# Patient Record
Sex: MALE | Race: WHITE | NOT HISPANIC OR LATINO
[De-identification: names, ages, dates, MRNs, and addresses within clinical notes are randomized per-mention and may not be internally consistent; named-entity substitution may affect disease eponyms.]

---

## 2017-12-27 PROBLEM — Z00.00 ENCOUNTER FOR PREVENTIVE HEALTH EXAMINATION: Status: ACTIVE | Noted: 2017-12-27

## 2018-01-26 ENCOUNTER — APPOINTMENT (OUTPATIENT)
Dept: NEUROLOGY | Facility: CLINIC | Age: 58
End: 2018-01-26
Payer: COMMERCIAL

## 2018-01-26 VITALS
WEIGHT: 199 LBS | HEART RATE: 65 BPM | BODY MASS INDEX: 29.47 KG/M2 | HEIGHT: 69 IN | DIASTOLIC BLOOD PRESSURE: 77 MMHG | SYSTOLIC BLOOD PRESSURE: 130 MMHG

## 2018-01-26 DIAGNOSIS — Z87.891 PERSONAL HISTORY OF NICOTINE DEPENDENCE: ICD-10-CM

## 2018-01-26 DIAGNOSIS — Z86.79 PERSONAL HISTORY OF OTHER DISEASES OF THE CIRCULATORY SYSTEM: ICD-10-CM

## 2018-01-26 DIAGNOSIS — Z82.0 FAMILY HISTORY OF EPILEPSY AND OTHER DISEASES OF THE NERVOUS SYSTEM: ICD-10-CM

## 2018-01-26 DIAGNOSIS — Z78.9 OTHER SPECIFIED HEALTH STATUS: ICD-10-CM

## 2018-01-26 DIAGNOSIS — Z81.8 FAMILY HISTORY OF OTHER MENTAL AND BEHAVIORAL DISORDERS: ICD-10-CM

## 2018-01-26 DIAGNOSIS — Z86.39 PERSONAL HISTORY OF OTHER ENDOCRINE, NUTRITIONAL AND METABOLIC DISEASE: ICD-10-CM

## 2018-01-26 DIAGNOSIS — K21.9 GASTRO-ESOPHAGEAL REFLUX DISEASE W/OUT ESOPHAGITIS: ICD-10-CM

## 2018-01-26 DIAGNOSIS — Z86.19 PERSONAL HISTORY OF OTHER INFECTIOUS AND PARASITIC DISEASES: ICD-10-CM

## 2018-01-26 DIAGNOSIS — Z82.69 FAMILY HISTORY OF OTHER DISEASES OF THE MUSCULOSKELETAL SYSTEM AND CONNECTIVE TISSUE: ICD-10-CM

## 2018-01-26 PROCEDURE — 99244 OFF/OP CNSLTJ NEW/EST MOD 40: CPT

## 2018-01-28 PROBLEM — Z82.0 FAMILY HISTORY OF PARKINSON'S DISEASE: Status: ACTIVE | Noted: 2018-01-28

## 2018-01-28 PROBLEM — Z86.79 HISTORY OF RHEUMATIC HEART DISEASE: Status: RESOLVED | Noted: 2018-01-28 | Resolved: 2018-01-28

## 2018-01-28 PROBLEM — K21.9 GERD (GASTROESOPHAGEAL REFLUX DISEASE): Status: ACTIVE | Noted: 2018-01-28

## 2018-01-28 PROBLEM — Z87.891 FORMER SMOKER: Status: ACTIVE | Noted: 2018-01-28

## 2018-01-28 PROBLEM — Z78.9 CURRENT NON-SMOKER: Status: ACTIVE | Noted: 2018-01-28

## 2018-01-28 PROBLEM — Z86.39 HISTORY OF HYPERCHOLESTEROLEMIA: Status: RESOLVED | Noted: 2018-01-28 | Resolved: 2018-01-28

## 2018-01-28 PROBLEM — Z86.19 HISTORY OF HERPES ZOSTER: Status: RESOLVED | Noted: 2018-01-28 | Resolved: 2018-01-28

## 2018-01-28 PROBLEM — Z86.79 HISTORY OF RHEUMATIC FEVER: Status: RESOLVED | Noted: 2018-01-28 | Resolved: 2018-01-28

## 2018-01-28 PROBLEM — Z82.69 FAMILY HISTORY OF SYSTEMIC LUPUS ERYTHEMATOSUS: Status: ACTIVE | Noted: 2018-01-28

## 2018-01-28 PROBLEM — Z81.8 FAMILY HISTORY OF DEMENTIA: Status: ACTIVE | Noted: 2018-01-28

## 2018-11-28 ENCOUNTER — APPOINTMENT (OUTPATIENT)
Dept: NEUROLOGY | Facility: CLINIC | Age: 58
End: 2018-11-28

## 2018-11-28 ENCOUNTER — APPOINTMENT (OUTPATIENT)
Dept: NEUROLOGY | Facility: CLINIC | Age: 58
End: 2018-11-28
Payer: COMMERCIAL

## 2018-11-28 VITALS
DIASTOLIC BLOOD PRESSURE: 85 MMHG | BODY MASS INDEX: 30.07 KG/M2 | SYSTOLIC BLOOD PRESSURE: 133 MMHG | HEIGHT: 69 IN | HEART RATE: 82 BPM | WEIGHT: 203 LBS

## 2018-11-28 DIAGNOSIS — H54.7 UNSPECIFIED VISUAL LOSS: ICD-10-CM

## 2018-11-28 PROCEDURE — 99214 OFFICE O/P EST MOD 30 MIN: CPT

## 2018-11-29 ENCOUNTER — OTHER (OUTPATIENT)
Age: 58
End: 2018-11-29

## 2018-11-29 PROBLEM — H54.7 VISION LOSS: Status: ACTIVE | Noted: 2018-01-28

## 2018-12-02 ENCOUNTER — FORM ENCOUNTER (OUTPATIENT)
Age: 58
End: 2018-12-02

## 2018-12-03 ENCOUNTER — APPOINTMENT (OUTPATIENT)
Dept: MRI IMAGING | Facility: CLINIC | Age: 58
End: 2018-12-03
Payer: COMMERCIAL

## 2018-12-03 ENCOUNTER — OUTPATIENT (OUTPATIENT)
Dept: OUTPATIENT SERVICES | Facility: HOSPITAL | Age: 58
LOS: 1 days | End: 2018-12-03
Payer: COMMERCIAL

## 2018-12-03 DIAGNOSIS — G98.8 OTHER DISORDERS OF NERVOUS SYSTEM: ICD-10-CM

## 2018-12-03 PROCEDURE — 70551 MRI BRAIN STEM W/O DYE: CPT

## 2018-12-03 PROCEDURE — 70551 MRI BRAIN STEM W/O DYE: CPT | Mod: 26

## 2019-01-02 ENCOUNTER — APPOINTMENT (OUTPATIENT)
Dept: NEUROLOGY | Facility: CLINIC | Age: 59
End: 2019-01-02
Payer: COMMERCIAL

## 2019-01-02 VITALS
HEART RATE: 59 BPM | BODY MASS INDEX: 30.07 KG/M2 | DIASTOLIC BLOOD PRESSURE: 66 MMHG | WEIGHT: 203 LBS | SYSTOLIC BLOOD PRESSURE: 124 MMHG | HEIGHT: 69 IN

## 2019-01-02 DIAGNOSIS — Z78.9 OTHER SPECIFIED HEALTH STATUS: ICD-10-CM

## 2019-01-02 PROCEDURE — 99214 OFFICE O/P EST MOD 30 MIN: CPT

## 2019-01-03 PROBLEM — Z78.9 ALCOHOL USE: Status: ACTIVE | Noted: 2018-01-28

## 2019-05-23 ENCOUNTER — RX RENEWAL (OUTPATIENT)
Age: 59
End: 2019-05-23

## 2019-07-24 ENCOUNTER — APPOINTMENT (OUTPATIENT)
Dept: NEUROLOGY | Facility: CLINIC | Age: 59
End: 2019-07-24

## 2019-11-11 ENCOUNTER — APPOINTMENT (OUTPATIENT)
Dept: NEUROLOGY | Facility: CLINIC | Age: 59
End: 2019-11-11
Payer: COMMERCIAL

## 2019-11-11 VITALS
HEART RATE: 56 BPM | DIASTOLIC BLOOD PRESSURE: 74 MMHG | WEIGHT: 200 LBS | BODY MASS INDEX: 29.62 KG/M2 | SYSTOLIC BLOOD PRESSURE: 120 MMHG | HEIGHT: 69 IN

## 2019-11-11 DIAGNOSIS — I10 ESSENTIAL (PRIMARY) HYPERTENSION: ICD-10-CM

## 2019-11-11 DIAGNOSIS — E78.5 HYPERLIPIDEMIA, UNSPECIFIED: ICD-10-CM

## 2019-11-11 PROCEDURE — 99214 OFFICE O/P EST MOD 30 MIN: CPT | Mod: 25

## 2019-11-11 PROCEDURE — 96116 NUBHVL XM PHYS/QHP 1ST HR: CPT | Mod: 59

## 2019-11-11 RX ORDER — LOVASTATIN 10 MG/1
10 TABLET ORAL
Refills: 0 | Status: ACTIVE | COMMUNITY
Start: 2017-12-01

## 2019-11-11 RX ORDER — FAMOTIDINE 20 MG/1
20 TABLET, FILM COATED ORAL
Refills: 0 | Status: COMPLETED | COMMUNITY
End: 2019-11-11

## 2019-11-11 RX ORDER — METOPROLOL SUCCINATE 50 MG/1
50 TABLET, EXTENDED RELEASE ORAL
Refills: 0 | Status: ACTIVE | COMMUNITY
Start: 2017-12-01

## 2019-11-11 RX ORDER — MV-MIN/FOLIC/VIT K/LYCOP/COQ10 200-100MCG
CAPSULE ORAL
Refills: 0 | Status: ACTIVE | COMMUNITY

## 2019-11-11 NOTE — HISTORY OF PRESENT ILLNESS
[FreeTextEntry1] : The patient is a 59 year old left handed man referred by Dr. Bee\par \par Initial visit with Dr. Bee was 1'18. He reported having visual disturbance since approx 2009 - dark spots in L eye lasting a few sec. 1 yr later had L eye herpes zoster. No visual loss. Blurred vision L eye 10-20 min. No HA. HAd a prior neuro w/u that was neg per Dr. Bee's note. A neuro note 2013 stated he c/o feeling faint since 2003, recurrent in 2009. He had reported then that he had ongoing chronic black spots in his vision. Asa and dopplers were recommended. 11'18 in f/u he was noted to have not gotten the tests done. Wife c/o dec memory and attn at that visit. He was driving well at the time and no decline in IADLs. He was referred for MRI and started on aricept. 1'19 in f/u HR as 59 on aricept, prior it was 82.\par \par \par Symptoms since approx 2016. Wife says he will forget they went to a certain restaurant. Wont have recalled supers he met in a building.  Never good with names. Repeats himself.  Forgets recent conversations and recent events. Doesnt usually cue. Good with recalling songs he likes, could learn new rescutitations and recite them. \par \par no hx stroke or seizure. no staring spells.\par \par vision these days is ok.  denies VH. says he could see shaking in his peripheral field but not consistent with r or left, says it is in both eyes. \par \par He reports hes a . Won't recall a job he had done when talking about it, but once he sees it he realizes it was his work. no decline in tool use. uses gps driving. thinks sense of direction may have gotten a little worse. she feels safe.  she thinks he relies on her for directions because he doesn’t pay attention. [place], [place] issues they say. no accidents or near accidents.  no issues remote control, kitchen appliances. no safety concerns.  wife does bills always.   IADLs intact\par \par they think he is better compared to before the aricept. mood also little calmer.\par \par He plans on retiring next yr possbly, says job is very stressful. Wants to move [facility], not work in [place]. \par \par no change in gait\par \par sometimes he has woken up with a sore tongue. but never had sz in sleep or tongue bleeding. says it felt like he had bitten his tongue to make himself wake up.\par \par mood/psych: says mood is good, just stress. stress if cant get things done. hes been calming down a lot he says recently, boss is better now.\par \par sleep: hx awaking from sleep with terrible feeling as if he could die per notes, not being able to breath well. snores. never tested for CASTRO. says he has woken up feeling like there was an explosion in his head that caused him to wake up. says he sleeps well. energy he says is pretty good but does need power nap. say he falls asleep immed very easily any time of day. no thrashing in sleep but occ leg mvmt in sleep.\par \par etoh: weekends or parties drinks 5 beers. can drink 10 at a wedding. this pattern for approx 12 yrs since moved [facility]. used to drink more when they didn’t have to drive back and forth to that house but now can only really drink on saturdays. started drinking 17yo. 30 yrs heavier use than this- 6- 10 beers on weekends, not much during the week. never missed work. says now he wouldn’t be able to drink as much as he used to. never drinks at work.\par \par this visit rec to reduce etoh, start thiamine, theracurmin. ordered HST, bloodwork, eeg, neuropsych testing.\par \par

## 2019-11-11 NOTE — PROCEDURE
[FreeTextEntry1] : EXTENDED NEUROBEHAVIORAL STATUS TESTING\par \par [This is a separate procedure note for the Neurobehavioral Status Examination that was performed during the encounter]. \par \par The patient was alert, well groomed, NAD. He was fluent with accented speech without paraphasic errors. No anomia in conversation except for names of people. Comprehension was intact. He actively participated in the discussion. He appeared euthymic and reactive. Regarding etoh use, acknowledged he feels compulsion to finish a drink he started, that he needs to drink when at a party, but that he is fine during week to just have tea. Said he would try to reduce the quantity of drinks. There was no psychomotor slowing.\par \par knows his medications.\par \par recent memory: can name the president and his positive opinion, knows about impeachment talks. struggled to name frontrunners- said fausto, gore and immed self corrected eventually to biden. got mariaa with first name as cue. needed phonemic cue for [place]. knows what he did this weekend with details. knows thanksgiving is coming up. \par \par MoCA (version 8.1) score out of 30: 23\par Memory index score (MIS) out of 15: 7\par Visuospatial/Executive \par 	Trails: intact good speed\par 	Cube: intact good planning\par 	Clock: intact\par Naming: intact 5/5 trials 1 and 2\par Memory- registration: intact\par Attention \par 	Digit span 5F: intact\par 	Digit span 3R: intact after selfcorrected error\par 	Letter A test: intact\par 	Serial 7 subtraction: intact 4/5\par Language \par 	Phrase repetition: (-1)\par 	F word fluency- # words: 13\par Abstraction\par 	Train/bicycle: transportation\par 	Watch/ruler: (-1) numbers\par Delayed recall score out of 5: (-4) 1\par 	Additional words recalled with category cue: 1\par 	Additional words recalled with multiple choice cue: 2\par Orientation: (-1) knew LI not more specific\par \par Additional neurobehavioral status tests:\par Animal naming fluency- # words: 19 and 1 repeat\par Praxis\par       Ideomotor limb\par             Transitive\par 	    Brush teeth: intact b/l\par 	    Comb hair: intact b/l\par             Intransitive\par 	    Wave goodbye: intact b/l\par 	    Motion "come here": intact b/l\par       Meaningless gestures: intact to 4/4\par Right/Left orientation\par 	"Show me your right hand": correct \par 	"Show me your left hand": correct \par 	"With your right hand touch your left ear": correct\par 	"With your right hand, point to my left shoulder": correct\par 	"With your left hand, point to my left ear": correct\par \par INTERPRETATION: \par \par I carefully reviewed the above results of neurobehavioral status testing. The cognitive domains are listed below with my interpretation of whether or not there is an impairment or if performance was within normal limits. \par It should be noted that this testing is distinct from standard neuropsychological testing, which compares the patient's raw scores on different validated batteries of tests to those of their age-matched peers. Therefore subtle deficits may not be apparent on this testing, or instead some incorrect responses may overestimate deficits.\par \par Cognitive domains:\par 	Attention: within normal limits \par 	Working memory: 2 errors\par 	Executive function: dec abstraction\par 	Language: better semantic than phonemic fluency. anomia to proper names in conversation that got with cues.\par 	Memory: good in conversation. on testing good registration, impaired spont recall, mostly repsonded to cues without false positives. repetition during fluency task.\par 	Visuospatial function: within normal limits \par 	Praxis: within normal limits \par 	Behavior/Mood: appropriate/euthymic\par 	Other comments: insight into problems intact and partial insight into etoh addiction. no psyhcomotor slowing.\par \par Please refer to the assessment section of this encounter that documents how to incorporate the interpretation of these results into the patient's diagnosis and plan of care.\par \par 35 min were taken to administer and interpret this extended neurobehavioral evaluation and prepare the report. \par \par Testing start time: 11a\par Testing stop time: 11:20a\par Report time: 15 min\par

## 2019-11-11 NOTE — ASSESSMENT
[FreeTextEntry1] : The patient is a 59 year old left handed man with cognitive decline since approx 2016 primarily affecting memory and name retrieval by history. Intact IADLs. On neurobehaivoral status testing he had good conversational memory, and retrieval memory deficit on exam. Semantic fluency better than phonemic. No psychomotor slowing. He was oriented. Mild errors with working memory and abstraction. \par \par Counseled that his testing seemed reassuring against this being an early stage of early onset Alzheimer's disease, though his symptoms of forgetting recent conversations and events without being able to cue did sound more c/w AD. I rec neuropsychological testing to get better assessment of extent and severity of symptoms and domains affected. Alternative etiologies for his symptoms we discussed include 1) alcohol related cognitive impairment. He has been drinking since 17yo, more binge-drinking on weekends/occasions. Has cut down since his 50s but still heavy use of at least 5 beers each weekend and at parties could be double that. 2) possible CASTRO given snoring, e/o waking up from sleep gasping. Vascular related cognitive impairment doesn’t seem to be a factor given that he only has mild microvascular ischemic disease on imaging and he doesn’t have psychomotor slowing that would expect for that either.  Will r/o vitamin def as well. May have had chemical exposures thru his former work as , currently works as .\par \par We discussed that the aricept hes taking is only FDA approved for dementia due to AD and so i wouldn’t have prescribed it at this point, however, he feels like his mood is better with it and that his symptoms may be better, so will keep it.

## 2019-11-11 NOTE — PHYSICAL EXAM
[FreeTextEntry1] : General appearance: The patient is awake and alert, in no acute distress.\par Eyes: PERRL, moist conjunctiva, no scleral icterus.\par ENT: Oropharynx clear of any exudate or lesions. Dentition unremarkable. No lesions on lips or gums.\par Neck: supple, trachea midline. \par Pulmonary: Normal respiratory effort, no audible wheezing.\par Cardiac: Regular rate and rhythm. \par Vascular: No peripheral edema.\par Musculoskeletal: Gait and strength as noted in "Neurologic Examination" below. No clubbing or cyanosis. Normal range of motion.\par Skin: Warm and dry. No rashes or lesions. No bruising.\par Neurologic: See separate "Neurologic Examination" below.\par Psychiatric: Mood, affect and orientation as noted below in "Extended Neurobehavioral Examination".\par \par \par \par NEUROLOGIC EXAMINATION\par \par Cranial Nerves: \par visual fields full to confrontation\par pupils equal round and reactive to light\par extraocular motion intact without nystagmus\par facial sensation intact symmetrically\par face symmetrical\par hearing intact to conversation bilaterally\par palate raises symmetrically, head turning and shoulder shrug symmetric, tongue midline without deviation with protrusion.\par No dysarthria.\par Motor: muscle tone normal\par            no pronator drift\par            fine finger movements symmetric \par            muscle strength normal in all 4 extremities and normal bulk in all 4 extremities\par Sensory exam: light touch was intact. Romberg's sign was negative\par Coordination: no tremor\par                       intact with finger to nose bilaterally\par                       balance was intact\par Gait: steady with a narrow base, good turn, good speed\par Deep tendon reflexes: 2+ at brachioradialis, biceps, triceps, and patellar bilaterally\par Primitive reflexes: No grasp reflex. b/l palmomental reflex. \par Cortical Sensory signs:  No extinction to double simultaneous stimulation.\par \par \par  Activities of Daily Living ([place]): independent vs. needs some help vs. unable/needs major assistance.      \par            --responses were the following: except where noted,    indep                                       \par 1. Bathing/showering\par 2. Dressing\par 3. Toileting\par 4. Transferring\par 5. Continence\par 6. Feeding                                                                                                                                                           \par \par Instrumental Activities of Daily Living ([place]): independent vs. needs some help vs. unable/needs major assistance.     \par            --responses were the following: except where noted, indep\par 1. Ability to use telephone\par 2. Shopping\par 3. Food preparation\par 4. Housekeeping- n/a\par 5. Laundry- n/a\par 6. Transportation (public/arranging rides/driving)\par 7. Responsibility for own medications\par 8. Ability to handle finances\par

## 2019-11-11 NOTE — DATA REVIEWED
[de-identified] : \par mri 8/17/09 said to angelia wilson per a note\par \par mri 12/3/18 mild microvascular ischemic disease \par i persaonally reviewed and agree, no focal atrophy, swi neg [de-identified] : \par eeg 8/17/09 nl

## 2019-11-18 ENCOUNTER — APPOINTMENT (OUTPATIENT)
Dept: NEUROLOGY | Facility: CLINIC | Age: 59
End: 2019-11-18
Payer: COMMERCIAL

## 2019-11-18 VITALS
DIASTOLIC BLOOD PRESSURE: 75 MMHG | HEART RATE: 62 BPM | SYSTOLIC BLOOD PRESSURE: 121 MMHG | HEIGHT: 69 IN | WEIGHT: 200 LBS | BODY MASS INDEX: 29.62 KG/M2

## 2019-11-18 DIAGNOSIS — F10.20 ALCOHOL DEPENDENCE, UNCOMPLICATED: ICD-10-CM

## 2019-11-18 PROCEDURE — 99214 OFFICE O/P EST MOD 30 MIN: CPT

## 2019-11-19 PROBLEM — F10.20 ALCOHOL ADDICTION: Status: ACTIVE | Noted: 2019-11-11

## 2019-11-19 NOTE — HISTORY OF PRESENT ILLNESS
[FreeTextEntry1] : Mr. Malin is a 59-year-old  male returns for followup evaluation having been evaluated by Dr. Diaz and I reviewed her detailed evaluation including analysis of memory dysfunction and she does not believe that the patient has Alzheimer's disease and given option of discontinuing Aricept but the patient believes that he has been benefited both in his memory function and his general cognitive gait is and despite my advice he wishes to continue the same. He has no side effects. Today I advised him that in view of followup care by Dr. Diaz he no longer needs to follow in my office to avoid unnecessary duplication of services. He denied any headache diplopia dysarthria dysphagia or dyspnea and there is no history of bilateral has weakness, bowel or bladder dysfunction. I reviewed his medications and allergies. There is no interim medical history.

## 2019-11-19 NOTE — DISCUSSION/SUMMARY
[FreeTextEntry1] : Opinion the patient has history of memory lapses and was advised to follow with Dr. Diaz and option was given to discontinue Aricept but she wishes to continue the same as he feels that it has given him good benefit and advised him to discuss this again with Dr. Diaz. I discharged him from my clinic to avoid unnecessary duplication of services and an appointment has been secured with Dr. Diaz. Education and counseling was provided.

## 2019-11-19 NOTE — PHYSICAL EXAM
[FreeTextEntry1] : Vital signs recorded and unremarkable. There is no carotid bruit, thyromegaly or lymphadenopathy. Chest is clear heart sounds are normal. 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 He has no language dysfunction. Optic discs are normal and visual fields are full and extraocular movements are full without nystagmus. There is no facial weakness and bulbar functions are intact. Motor tone is present with symmetric strength and reflexes without Babinski sign and all sensory modalities are intact with normal gait.

## 2019-11-19 NOTE — DATA REVIEWED
[de-identified] : I reviewed the detailed consultation report by Dr. Diaz with excellent analysis and I agree with her and all options were discussed with the patient. He wishes to continue Aricept.

## 2019-11-21 ENCOUNTER — LABORATORY RESULT (OUTPATIENT)
Age: 59
End: 2019-11-21

## 2019-11-22 ENCOUNTER — RX RENEWAL (OUTPATIENT)
Age: 59
End: 2019-11-22

## 2019-11-22 LAB
B BURGDOR IGG+IGM SER QL IB: NORMAL
TSH SERPL-ACNC: 1.16 UIU/ML
VIT B12 SERPL-MCNC: 776 PG/ML

## 2019-11-25 ENCOUNTER — APPOINTMENT (OUTPATIENT)
Dept: NEUROLOGY | Facility: CLINIC | Age: 59
End: 2019-11-25
Payer: COMMERCIAL

## 2019-11-25 LAB
T PALLIDUM AB SER QL IA: NEGATIVE
THYROGLOB AB SERPL-ACNC: <20 IU/ML
THYROPEROXIDASE AB SERPL IA-ACNC: 102 IU/ML

## 2019-11-25 PROCEDURE — 95816 EEG AWAKE AND DROWSY: CPT

## 2019-11-26 LAB — VIT B1 SERPL-MCNC: 291.3 NMOL/L

## 2019-12-06 ENCOUNTER — APPOINTMENT (OUTPATIENT)
Dept: NEUROLOGY | Facility: CLINIC | Age: 59
End: 2019-12-06
Payer: COMMERCIAL

## 2019-12-06 PROCEDURE — 96139 PSYCL/NRPSYC TST TECH EA: CPT

## 2019-12-06 PROCEDURE — 96132 NRPSYC TST EVAL PHYS/QHP 1ST: CPT

## 2019-12-06 PROCEDURE — 96116 NUBHVL XM PHYS/QHP 1ST HR: CPT

## 2019-12-06 PROCEDURE — 96138 PSYCL/NRPSYC TECH 1ST: CPT

## 2019-12-06 PROCEDURE — 96133 NRPSYC TST EVAL PHYS/QHP EA: CPT

## 2019-12-13 ENCOUNTER — APPOINTMENT (OUTPATIENT)
Dept: NEUROLOGY | Facility: CLINIC | Age: 59
End: 2019-12-13

## 2020-01-17 ENCOUNTER — APPOINTMENT (OUTPATIENT)
Dept: NEUROLOGY | Facility: CLINIC | Age: 60
End: 2020-01-17
Payer: COMMERCIAL

## 2020-01-17 PROCEDURE — 96132 NRPSYC TST EVAL PHYS/QHP 1ST: CPT

## 2020-06-25 ENCOUNTER — APPOINTMENT (OUTPATIENT)
Dept: NEUROLOGY | Facility: CLINIC | Age: 60
End: 2020-06-25
Payer: COMMERCIAL

## 2020-06-25 VITALS — TEMPERATURE: 97.7 F

## 2020-06-25 VITALS
HEIGHT: 69 IN | HEART RATE: 68 BPM | DIASTOLIC BLOOD PRESSURE: 76 MMHG | SYSTOLIC BLOOD PRESSURE: 127 MMHG | BODY MASS INDEX: 29.62 KG/M2 | WEIGHT: 200 LBS

## 2020-06-25 DIAGNOSIS — Z78.9 OTHER SPECIFIED HEALTH STATUS: ICD-10-CM

## 2020-06-25 DIAGNOSIS — R06.83 SNORING: ICD-10-CM

## 2020-06-25 DIAGNOSIS — R41.844 FRONTAL LOBE AND EXECUTIVE FUNCTION DEFICIT: ICD-10-CM

## 2020-06-25 DIAGNOSIS — R76.8 OTHER SPECIFIED ABNORMAL IMMUNOLOGICAL FINDINGS IN SERUM: ICD-10-CM

## 2020-06-25 PROCEDURE — 99215 OFFICE O/P EST HI 40 MIN: CPT

## 2020-06-25 RX ORDER — DONEPEZIL HYDROCHLORIDE 10 MG/1
10 TABLET ORAL DAILY
Qty: 90 | Refills: 3 | Status: COMPLETED | COMMUNITY
Start: 2018-11-28 | End: 2020-06-25

## 2020-10-15 ENCOUNTER — OUTPATIENT (OUTPATIENT)
Dept: OUTPATIENT SERVICES | Facility: HOSPITAL | Age: 60
LOS: 1 days | End: 2020-10-15
Payer: COMMERCIAL

## 2020-10-15 VITALS
HEIGHT: 70 IN | HEART RATE: 74 BPM | WEIGHT: 199.96 LBS | OXYGEN SATURATION: 99 % | DIASTOLIC BLOOD PRESSURE: 71 MMHG | SYSTOLIC BLOOD PRESSURE: 127 MMHG | TEMPERATURE: 98 F | RESPIRATION RATE: 14 BRPM

## 2020-10-15 DIAGNOSIS — Z90.89 ACQUIRED ABSENCE OF OTHER ORGANS: Chronic | ICD-10-CM

## 2020-10-15 DIAGNOSIS — Z11.59 ENCOUNTER FOR SCREENING FOR OTHER VIRAL DISEASES: ICD-10-CM

## 2020-10-15 DIAGNOSIS — S82.52XA DISPLACED FRACTURE OF MEDIAL MALLEOLUS OF LEFT TIBIA, INITIAL ENCOUNTER FOR CLOSED FRACTURE: ICD-10-CM

## 2020-10-15 DIAGNOSIS — Z01.818 ENCOUNTER FOR OTHER PREPROCEDURAL EXAMINATION: ICD-10-CM

## 2020-10-15 RX ORDER — METOPROLOL TARTRATE 50 MG
1 TABLET ORAL
Qty: 0 | Refills: 0 | DISCHARGE

## 2020-10-15 RX ORDER — FAMOTIDINE 10 MG/ML
0 INJECTION INTRAVENOUS
Qty: 0 | Refills: 0 | DISCHARGE

## 2020-10-15 NOTE — H&P PST ADULT - HISTORY OF PRESENT ILLNESS
59 y/o male with PMH oF HTN with h/o fall and with left ankle fracture , scheduled for ORIF in NAD. History reviewed with patient and wife and denies any other injuries with fall

## 2020-10-15 NOTE — H&P PST ADULT - EYES
2/1/2017  1:30 PM.    Phone call made to patient daughter Marlena Tompkins. Described the findings of the blood tests, the next step, and the plan.     I discussed that I will see patient in the office and we will discuss taking a single dose of iron sulfate riri EOMI; PERRL; no drainage or redness

## 2020-10-15 NOTE — H&P PST ADULT - ASSESSMENT
59 y/o male with PMH  with HTN with left ankle facture  Planned surgery.- ORIF left malleolar fracture  hospitalist to clear the patient the DOS  covid testing 10/19/20- 1100  Pre op instructions provided  Instructions provided on medications to continue and to take the day morning of surgery   59 y/o male with PMH  with HTN with left ankle facture  Planned surgery.- ORIF left malleolar fracture  hospitalist to clear the patient the DOS  covid testing 10/19/20- 1100  Pre op instructions provided  Instructions provided on medications to continue and to take the day morning of surgery    COVID testing negative

## 2020-10-15 NOTE — H&P PST ADULT - NSANTHOSAYNRD_GEN_A_CORE
No. CASTRO screening performed.  STOP BANG Legend: 0-2 = LOW Risk; 3-4 = INTERMEDIATE Risk; 5-8 = HIGH Risk

## 2020-10-16 PROCEDURE — G0463: CPT

## 2020-10-19 ENCOUNTER — OUTPATIENT (OUTPATIENT)
Dept: OUTPATIENT SERVICES | Facility: HOSPITAL | Age: 60
LOS: 1 days | End: 2020-10-19
Payer: COMMERCIAL

## 2020-10-19 DIAGNOSIS — Z11.59 ENCOUNTER FOR SCREENING FOR OTHER VIRAL DISEASES: ICD-10-CM

## 2020-10-19 DIAGNOSIS — Z90.89 ACQUIRED ABSENCE OF OTHER ORGANS: Chronic | ICD-10-CM

## 2020-10-19 LAB — SARS-COV-2 RNA SPEC QL NAA+PROBE: SIGNIFICANT CHANGE UP

## 2020-10-19 PROCEDURE — U0003: CPT

## 2020-10-20 ENCOUNTER — TRANSCRIPTION ENCOUNTER (OUTPATIENT)
Age: 60
End: 2020-10-20

## 2020-10-20 NOTE — ASU DISCHARGE PLAN (ADULT/PEDIATRIC) - CALL YOUR DOCTOR IF YOU HAVE ANY OF THE FOLLOWING:
Numbness, tingling, color or temperature change to extremity/Wound/Surgical Site with redness, or foul smelling discharge or pus/Swelling that gets worse/Pain not relieved by Medications/Bleeding that does not stop/Fever greater than (need to indicate Fahrenheit or Celsius)/Nausea and vomiting that does not stop

## 2020-10-20 NOTE — ASU DISCHARGE PLAN (ADULT/PEDIATRIC) - CARE PROVIDER_API CALL
Harshil Li)  Orthopaedic Surgery  [address] [place] [address]  Phone: [phone]  Fax: [phone]  Follow Up Time: 2 weeks

## 2020-10-20 NOTE — ASU DISCHARGE PLAN (ADULT/PEDIATRIC) - ASU DC SPECIAL INSTRUCTIONSFT
rest  ice  elevate  NON- Weight bearing on operated extremity  Follow up Dr. Li office 2 weeks    Follow up Primary Care MD following discharge

## 2020-10-21 ENCOUNTER — OUTPATIENT (OUTPATIENT)
Dept: OUTPATIENT SERVICES | Facility: HOSPITAL | Age: 60
LOS: 1 days | End: 2020-10-21
Payer: COMMERCIAL

## 2020-10-21 ENCOUNTER — RESULT REVIEW (OUTPATIENT)
Age: 60
End: 2020-10-21

## 2020-10-21 VITALS
HEART RATE: 82 BPM | OXYGEN SATURATION: 94 % | DIASTOLIC BLOOD PRESSURE: 66 MMHG | SYSTOLIC BLOOD PRESSURE: 103 MMHG | RESPIRATION RATE: 15 BRPM

## 2020-10-21 VITALS
DIASTOLIC BLOOD PRESSURE: 71 MMHG | HEART RATE: 74 BPM | OXYGEN SATURATION: 98 % | RESPIRATION RATE: 16 BRPM | SYSTOLIC BLOOD PRESSURE: 127 MMHG | TEMPERATURE: 97 F

## 2020-10-21 DIAGNOSIS — S82.52XA DISPLACED FRACTURE OF MEDIAL MALLEOLUS OF LEFT TIBIA, INITIAL ENCOUNTER FOR CLOSED FRACTURE: ICD-10-CM

## 2020-10-21 DIAGNOSIS — Z90.89 ACQUIRED ABSENCE OF OTHER ORGANS: Chronic | ICD-10-CM

## 2020-10-21 LAB
ANION GAP SERPL CALC-SCNC: 10 MMOL/L — SIGNIFICANT CHANGE UP (ref 5–17)
BUN SERPL-MCNC: 17 MG/DL — SIGNIFICANT CHANGE UP (ref 7–23)
CALCIUM SERPL-MCNC: 9.2 MG/DL — SIGNIFICANT CHANGE UP (ref 8.4–10.5)
CHLORIDE SERPL-SCNC: 107 MMOL/L — SIGNIFICANT CHANGE UP (ref 96–108)
CO2 SERPL-SCNC: 25 MMOL/L — SIGNIFICANT CHANGE UP (ref 22–31)
CREAT SERPL-MCNC: 0.88 MG/DL — SIGNIFICANT CHANGE UP (ref 0.5–1.3)
GLUCOSE SERPL-MCNC: 104 MG/DL — HIGH (ref 70–99)
HCT VFR BLD CALC: 51.3 % — HIGH (ref 39–50)
HGB BLD-MCNC: 17.1 G/DL — HIGH (ref 13–17)
MCHC RBC-ENTMCNC: 30.7 PG — SIGNIFICANT CHANGE UP (ref 27–34)
MCHC RBC-ENTMCNC: 33.3 GM/DL — SIGNIFICANT CHANGE UP (ref 32–36)
MCV RBC AUTO: 92.1 FL — SIGNIFICANT CHANGE UP (ref 80–100)
NRBC # BLD: 0 /100 WBCS — SIGNIFICANT CHANGE UP (ref 0–0)
PLATELET # BLD AUTO: 216 K/UL — SIGNIFICANT CHANGE UP (ref 150–400)
POTASSIUM SERPL-MCNC: 4.6 MMOL/L — SIGNIFICANT CHANGE UP (ref 3.5–5.3)
POTASSIUM SERPL-SCNC: 4.6 MMOL/L — SIGNIFICANT CHANGE UP (ref 3.5–5.3)
RBC # BLD: 5.57 M/UL — SIGNIFICANT CHANGE UP (ref 4.2–5.8)
RBC # FLD: 13 % — SIGNIFICANT CHANGE UP (ref 10.3–14.5)
SODIUM SERPL-SCNC: 142 MMOL/L — SIGNIFICANT CHANGE UP (ref 135–145)
WBC # BLD: 7.59 K/UL — SIGNIFICANT CHANGE UP (ref 3.8–10.5)
WBC # FLD AUTO: 7.59 K/UL — SIGNIFICANT CHANGE UP (ref 3.8–10.5)

## 2020-10-21 PROCEDURE — 93005 ELECTROCARDIOGRAM TRACING: CPT

## 2020-10-21 PROCEDURE — C1713: CPT

## 2020-10-21 PROCEDURE — 93010 ELECTROCARDIOGRAM REPORT: CPT

## 2020-10-21 PROCEDURE — 76000 FLUOROSCOPY <1 HR PHYS/QHP: CPT

## 2020-10-21 PROCEDURE — 99202 OFFICE O/P NEW SF 15 MIN: CPT

## 2020-10-21 PROCEDURE — 27766 OPTX MEDIAL ANKLE FX: CPT | Mod: LT

## 2020-10-21 PROCEDURE — 36415 COLL VENOUS BLD VENIPUNCTURE: CPT

## 2020-10-21 PROCEDURE — 97161 PT EVAL LOW COMPLEX 20 MIN: CPT

## 2020-10-21 PROCEDURE — 80048 BASIC METABOLIC PNL TOTAL CA: CPT

## 2020-10-21 PROCEDURE — 85027 COMPLETE CBC AUTOMATED: CPT

## 2020-10-21 RX ORDER — ASPIRIN/CALCIUM CARB/MAGNESIUM 324 MG
1 TABLET ORAL
Qty: 30 | Refills: 0
Start: 2020-10-21 | End: 2020-11-19

## 2020-10-21 RX ORDER — CHLORHEXIDINE GLUCONATE 213 G/1000ML
1 SOLUTION TOPICAL ONCE
Refills: 0 | Status: COMPLETED | OUTPATIENT
Start: 2020-10-21 | End: 2020-10-21

## 2020-10-21 RX ORDER — OXYCODONE HYDROCHLORIDE 5 MG/1
5 TABLET ORAL ONCE
Refills: 0 | Status: DISCONTINUED | OUTPATIENT
Start: 2020-10-21 | End: 2020-10-22

## 2020-10-21 RX ORDER — SODIUM CHLORIDE 9 MG/ML
1000 INJECTION, SOLUTION INTRAVENOUS
Refills: 0 | Status: DISCONTINUED | OUTPATIENT
Start: 2020-10-21 | End: 2020-10-22

## 2020-10-21 RX ORDER — APREPITANT 80 MG/1
40 CAPSULE ORAL ONCE
Refills: 0 | Status: COMPLETED | OUTPATIENT
Start: 2020-10-21 | End: 2020-10-21

## 2020-10-21 RX ORDER — ONDANSETRON 8 MG/1
4 TABLET, FILM COATED ORAL ONCE
Refills: 0 | Status: DISCONTINUED | OUTPATIENT
Start: 2020-10-21 | End: 2020-10-22

## 2020-10-21 RX ORDER — HYDROMORPHONE HYDROCHLORIDE 2 MG/ML
0.5 INJECTION INTRAMUSCULAR; INTRAVENOUS; SUBCUTANEOUS
Refills: 0 | Status: DISCONTINUED | OUTPATIENT
Start: 2020-10-21 | End: 2020-10-22

## 2020-10-21 RX ORDER — CEFAZOLIN SODIUM 1 G
2000 VIAL (EA) INJECTION ONCE
Refills: 0 | Status: COMPLETED | OUTPATIENT
Start: 2020-10-21 | End: 2020-10-21

## 2020-10-21 RX ADMIN — CHLORHEXIDINE GLUCONATE 1 APPLICATION(S): 213 SOLUTION TOPICAL at 11:43

## 2020-10-21 RX ADMIN — SODIUM CHLORIDE 75 MILLILITER(S): 9 INJECTION, SOLUTION INTRAVENOUS at 15:23

## 2020-10-21 RX ADMIN — APREPITANT 40 MILLIGRAM(S): 80 CAPSULE ORAL at 11:43

## 2020-10-21 NOTE — CONSULT NOTE ADULT - ASSESSMENT
61 y/o male with PMH oF HTN with h/o fall and with left ankle fracture being evaluated for periperative evaluation.   Patient does meet the 4 METS requirement to proceed to surgery and would be considered a Class I risk under the RCRI crtieria and no further testing would be required.  In regards to his cardiac catheterization event, its appears to be an allergy to the dye rather than a cardiac event.  Patient does not report having any stents or further workup being required.  I have communicated this over to Anesthesia team.

## 2020-10-21 NOTE — CONSULT NOTE ADULT - SUBJECTIVE AND OBJECTIVE BOX
HPI: 61 y/o male with PMH oF HTN with h/o fall and with left ankle fracture , scheduled for ORIF in NAD. History reviewed with patient and wife and denies any other injuries with fall.  Patient denies any chest pain, SOB or any dyspnea on exertion.  He works as a  in [place] and has had no issues carrying heavy equipment up and down flights of stairs.  He does state that a few years ago he underwent what he describes as an elective Cardiac Catheterization with benign results.  No stents or bypass.  However during the event he states that he may have had an allergic reaction to the contrast dye.      REVIEW OF SYSTEMS:  CONSTITUTIONAL: No fever, weight loss, or fatigue  EYES: No eye pain, visual disturbances, or discharge  ENMT:  No difficulty hearing, tinnitus, vertigo; No sinus or throat pain  NECK: No pain or stiffness  RESPIRATORY: No cough, wheezing, chills or hemoptysis; No shortness of breath  CARDIOVASCULAR: No chest pain, palpitations, dizziness, or leg swelling  GASTROINTESTINAL: No abdominal or epigastric pain. No nausea, vomiting, or hematemesis; No diarrhea or constipation. No melena or hematochezia.  GENITOURINARY: No dysuria, frequency, hematuria, or incontinence  NEUROLOGICAL: No headaches, memory loss, loss of strength, numbness, or tremors  MUSCULOSKELETAL: No muscle or back pain      PAST MEDICAL & SURGICAL HISTORY:  Mild acid reflux    Malleolar fracture  left ankle    Hypertension    S/P tonsillectomy        SOCIAL HISTORY:  Negative for Tobacco, EtOH and Illicit Drugs    Allergies    No Known Allergies    Intolerances        MEDICATIONS  (STANDING):    MEDICATIONS  (PRN):      FAMILY HISTORY:      Vital Signs Last 24 Hrs  T(C): 36.1 (21 Oct 2020 11:48), Max: 36.1 (21 Oct 2020 11:28)  T(F): 97 (21 Oct 2020 11:48), Max: 97 (21 Oct 2020 11:28)  HR: 74 (21 Oct 2020 11:48) (74 - 74)  BP: 127/71 (21 Oct 2020 11:48) (127/71 - 127/71)  BP(mean): --  RR: 16 (21 Oct 2020 11:48) (16 - 16)  SpO2: 98% (21 Oct 2020 11:28) (98% - 98%)    PHYSICAL EXAM:    GENERAL: NAD, well-developed  HEAD:  Atraumatic, Normocephalic  EYES: EOMI, PERRLA, conjunctiva and sclera clear  ENMT: No tonsillar erythema, exudates, or enlargement; Moist mucous membranes, Good dentition, No lesions  NECK: Supple, No JVD, Normal thyroid  NERVOUS SYSTEM:  Alert & Oriented X3, Good concentration;  CHEST/LUNG: Clear to auscultation bilaterally; No rales, rhonchi, wheezing, or rubs  HEART: Regular rate and rhythm; No murmurs, rubs, or gallops  ABDOMEN: Soft, Nontender, Nondistended; Bowel sounds present  EXTREMITIES:  2+ Peripheral Pulses, No clubbing, cyanosis, or edema      LABS:                        17.1   7.59  )-----------( 216      ( 21 Oct 2020 11:50 )             51.3     10-21    142  |  107  |  17  ----------------------------<  104<H>  4.6   |  25  |  0.88    Ca    9.2      21 Oct 2020 11:50          CAPILLARY BLOOD GLUCOSE          RADIOLOGY & ADDITIONAL STUDIES:    EKG:   Personally Reviewed:  [ ] YES     Imaging:   Personally Reviewed:  [ ] YES     Consultant(s) Notes Reviewed:      Care Discussed with Consultants/Other Providers:

## 2020-10-21 NOTE — BRIEF OPERATIVE NOTE - NSICDXBRIEFPOSTOP_GEN_ALL_CORE_FT
POST-OP DIAGNOSIS:  Fracture of medial malleolus, left, closed 21-Oct-2020 15:03:57  Alexia Altman

## 2021-10-06 PROBLEM — I10 ESSENTIAL HYPERTENSION: Status: ACTIVE | Noted: 2018-01-28

## 2023-05-31 NOTE — ASU PATIENT PROFILE, ADULT - BRADEN SCORE (IF 18 OR LESS ACTIVATE SKIN INJURY RISK INCREASED GUIDELINE), MLM
21 Muscle Hinge Flap Text: The defect edges were debeveled with a #15 scalpel blade.  Given the size, depth and location of the defect and the proximity to free margins a muscle hinge flap was deemed most appropriate.  Using a sterile surgical marker, an appropriate hinge flap was drawn incorporating the defect. The area thus outlined was incised with a #15 scalpel blade.  The skin margins were undermined to an appropriate distance in all directions utilizing iris scissors.
